# Patient Record
Sex: FEMALE | Race: WHITE | Employment: FULL TIME | URBAN - METROPOLITAN AREA
[De-identification: names, ages, dates, MRNs, and addresses within clinical notes are randomized per-mention and may not be internally consistent; named-entity substitution may affect disease eponyms.]

---

## 2018-07-17 ENCOUNTER — OFFICE VISIT (OUTPATIENT)
Dept: FAMILY MEDICINE | Facility: CLINIC | Age: 58
End: 2018-07-17
Payer: COMMERCIAL

## 2018-07-17 VITALS
OXYGEN SATURATION: 97 % | TEMPERATURE: 98 F | WEIGHT: 141 LBS | BODY MASS INDEX: 26.62 KG/M2 | SYSTOLIC BLOOD PRESSURE: 129 MMHG | DIASTOLIC BLOOD PRESSURE: 73 MMHG | HEIGHT: 61 IN | RESPIRATION RATE: 20 BRPM | HEART RATE: 82 BPM

## 2018-07-17 DIAGNOSIS — Z13.1 SCREENING FOR DIABETES MELLITUS: ICD-10-CM

## 2018-07-17 DIAGNOSIS — Z12.4 SCREENING FOR MALIGNANT NEOPLASM OF CERVIX: ICD-10-CM

## 2018-07-17 DIAGNOSIS — Z12.31 VISIT FOR SCREENING MAMMOGRAM: ICD-10-CM

## 2018-07-17 DIAGNOSIS — Z11.59 NEED FOR HEPATITIS C SCREENING TEST: ICD-10-CM

## 2018-07-17 DIAGNOSIS — Z13.6 CARDIOVASCULAR SCREENING; LDL GOAL LESS THAN 160: ICD-10-CM

## 2018-07-17 DIAGNOSIS — M72.0 DUPUYTREN'S CONTRACTURE OF BOTH HANDS: ICD-10-CM

## 2018-07-17 DIAGNOSIS — Z11.4 SCREENING FOR HIV (HUMAN IMMUNODEFICIENCY VIRUS): ICD-10-CM

## 2018-07-17 DIAGNOSIS — M25.649 STIFFNESS OF HAND JOINT, UNSPECIFIED LATERALITY: Primary | ICD-10-CM

## 2018-07-17 DIAGNOSIS — Z12.11 SCREEN FOR COLON CANCER: ICD-10-CM

## 2018-07-17 PROCEDURE — 99213 OFFICE O/P EST LOW 20 MIN: CPT | Performed by: FAMILY MEDICINE

## 2018-07-17 NOTE — PROGRESS NOTES
"SUBJECTIVE:  Sherri Oh is a 57 year old female who is seen as self referral for  bilateral hand stiffness.  The pain denies any injury to her hands  4 years ago she noticed some stifness   3 month(s) after that she noticed some tendons to look thicker.     There is some swelling at night    She works at [email or web address] and uses her hands     She denies any rheumatological condirions in her family.     She reports that she drinks on holidays only.       The patients past medical, surgical, social and family histories were reviewed.  Social History     Social History     Marital status:      Spouse name: N/A     Number of children: N/A     Years of education: N/A     Social History Main Topics     Smoking status: Former Smoker     Smokeless tobacco: Current User     Alcohol use Yes     Drug use: No     Sexual activity: Yes     Other Topics Concern     None     Social History Narrative     None           OBJECTIVE:  /73  Pulse 82  Temp 98  F (36.7  C) (Oral)  Resp 20  Ht 5' 1\" (1.549 m)  Wt 141 lb (64 kg)  SpO2 97%  BMI 26.64 kg/m2    HAND EXAM:  GENERAL APPEARANCE: healthy, alert and no distress  SKIN: no suspicious lesions or rashes  NEURO: Normal strength and tone, mentation intact and speech normal  PSYCH:  mentation appears normal and affect normal/bright    Inspection: there were two visibly thickened tendons (one on each hand)  She was unabel to fully extend the involved fingers      Tender: All Normal        ASSESSMENT/PLAN      (M72.0) Dupuytren's contracture of both hands  Comment:   Plan: ORTHOPEDICS ADULT REFERRAL        Hand surgery    (Z13.1) Screening for diabetes mellitus  Comment:   Plan: Comprehensive metabolic panel            (Z13.6) CARDIOVASCULAR SCREENING; LDL GOAL LESS THAN 160  Comment:   Plan: Lipid panel reflex to direct LDL Fasting,         Comprehensive metabolic panel            Patient Instructions   I recommended that she return to clinic for an appointment " in the next few month(s)  for her yearly complete physical exam and we will get all of her preventative medical needs taken care of at that time. I also recommended that he have a laboratory appointment 1 week prior to that to do her fasting laboratory work.      [place] [facility] [facility] [phone]    Dr Devlin in Hand surgery

## 2018-07-17 NOTE — PATIENT INSTRUCTIONS
I recommended that she return to clinic for an appointment in the next few month(s)  for her yearly complete physical exam and we will get all of her preventative medical needs taken care of at that time. I also recommended that he have a laboratory appointment 1 week prior to that to do her fasting laboratory work.      [place] [facility] [facility] [phone]    Dr Devlin in Hand surgery

## 2018-07-17 NOTE — MR AVS SNAPSHOT
After Visit Summary   7/17/2018    Sherri Oh    MRN: [id number]           Patient Information     Date Of Birth          1960        Visit Information        Provider Department      7/17/2018 11:45 AM Jh Ruff MD [facility]        Today's Diagnoses     Stiffness of hand joint, unspecified laterality    -  1    Screen for colon cancer        Visit for screening mammogram        Screening for malignant neoplasm of cervix        Need for hepatitis C screening test        Screening for HIV (human immunodeficiency virus)        Dupuytren's contracture of both hands        Screening for diabetes mellitus        CARDIOVASCULAR SCREENING; LDL GOAL LESS THAN 160          Care Instructions    I recommended that she return to clinic for an appointment in the next few month(s)  for her yearly complete physical exam and we will get all of her preventative medical needs taken care of at that time. I also recommended that he have a laboratory appointment 1 week prior to that to do her fasting laboratory work.      Your provider has referred you to: [facility]: [facility] [phone]    Dr Devlin in Hand surgery          Follow-ups after your visit        Additional Services     ORTHOPEDICS ADULT REFERRAL       Your provider has referred you to: [facility] [place]: [facility] [phone]   [email or web address]    Please be aware that coverage of these services is subject to the terms and limitations of your health insurance plan.  Call member services at your health plan with any benefit or coverage questions.      Please bring the following to your appointment:    >>   Any x-rays, CTs or MRIs which have been performed.  Contact the facility where they were done to arrange for  prior to your scheduled appointment.    >>   " List of current medications   >>   This referral request   >>   Any documents/labs given to you for this referral                  Future tests that were ordered for you today     Open Future Orders        Priority Expected Expires Ordered    Hepatitis C Screen Reflex to HCV RNA Quant and Genotype Routine  9/17/2018 7/17/2018    Lipid panel reflex to direct LDL Fasting Routine  9/17/2018 7/17/2018    Comprehensive metabolic panel Routine  9/17/2018 7/17/2018            Who to contact     If you have questions or need follow up information about today's clinic visit or your schedule please contact [facility] directly at [phone].  Normal or non-critical lab and imaging results will be communicated to you by MyChart, letter or phone within 4 business days after the clinic has received the results. If you do not hear from us within 7 days, please contact the clinic through MyChart or phone. If you have a critical or abnormal lab result, we will notify you by phone as soon as possible.  Submit refill requests through [email or web address] or call your pharmacy and they will forward the refill request to us. Please allow 3 business days for your refill to be completed.          Additional Information About Your Visit        Care EveryWhere ID     This is your Care EveryWhere ID. This could be used by other organizations to access your [facility] medical records  [id number]        Your Vitals Were     Pulse Temperature Respirations Height Pulse Oximetry BMI (Body Mass Index)    82 98  F (36.7  C) (Oral) 20 5' 1\" (1.549 m) 97% 26.64 kg/m2       Blood Pressure from Last 3 Encounters:   07/17/18 129/73    Weight from Last 3 Encounters:   07/17/18 141 lb (64 kg)              We Performed the Following     ORTHOPEDICS ADULT REFERRAL        Primary Care Provider Office Phone # Fax #    [facility] [phone] [id number] [facility] [address]        Equal Access to Services     MARY " GAAR : Hadii aad ku hadulises Soto, wacatrachitoda luqadaha, qaalvarota kaemanuel marycamiwan, dominic dobsontalitayareli ennis. So [facility] [phone].    ATENCIÓN: Si habla español, tiene a khan disposición servicios gratuitos de asistencia lingüística. Llame al [phone].    We comply with applicable federal civil rights laws and [place] laws. We do not discriminate on the basis of race, color, national origin, age, disability, sex, sexual orientation, or gender identity.            Thank you!     [facility] you for [facility]  [facility]. Our goal is always to provide you with excellent care. Hearing back from our patients is one way we can continue to improve our services. Please take a few minutes to complete the written survey that you may receive in the mail after your visit with us. Thank you!             Your Updated Medication List - Protect others around you: Learn how to safely use, store and throw away your medicines at www.disposemymeds.org.      Notice  As of 7/17/2018 12:04 PM    You have not been prescribed any medications.

## 2018-07-17 NOTE — NURSING NOTE
"Chief Complaint   Patient presents with     other     hands feel tight, on going x 4 years     Health Maintenance     orders pended, adv dir, PHQ-2, Physical/PAP, TDAP       Initial /73  Pulse 82  Temp 98  F (36.7  C) (Oral)  Resp 20  Ht 5' 1\" (1.549 m)  Wt 141 lb (64 kg)  SpO2 97%  BMI 26.64 kg/m2 Estimated body mass index is 26.64 kg/(m^2) as calculated from the following:    Height as of this encounter: 5' 1\" (1.549 m).    Weight as of this encounter: 141 lb (64 kg).  Medication Reconciliation: complete  Emily Christiansen, AMADOU  "

## 2018-08-28 ENCOUNTER — TELEPHONE (OUTPATIENT)
Dept: FAMILY MEDICINE | Facility: CLINIC | Age: 58
End: 2018-08-28

## 2018-08-28 NOTE — TELEPHONE ENCOUNTER
8/28/2018    Attempt 1    Contacted patient in regards to scheduling VIP mammogram  Message on voicemail     Patient is also due for - Preventive Health Screening Colonoscopy and Cervical/PAP    Comments:       Outreach   Linda SPIVEY

## 2018-09-25 NOTE — TELEPHONE ENCOUNTER
9/25/2018    Attempt 3    Contacted patient in regards to scheduling VIP mammogram  Message on voicemail     Patient is also due for - Preventive Health Screening Colonoscopy and Cervical/PAP    Comments:       Outreach   Elayne Quinteros

## 2018-12-04 ENCOUNTER — DOCUMENTATION ONLY (OUTPATIENT)
Dept: LAB | Facility: CLINIC | Age: 58
End: 2018-12-04

## 2018-12-04 NOTE — PROGRESS NOTES
This patient has overdue labs. A letter was sent on 10/29/2018 and there has been no lab appointment made. If you still want these labs done, please have your care team contact the patient to make a lab appointment. Otherwise, please have the labs discontinued and close the encounter.    Thank you,  [facility]